# Patient Record
Sex: FEMALE | Race: ASIAN | URBAN - METROPOLITAN AREA
[De-identification: names, ages, dates, MRNs, and addresses within clinical notes are randomized per-mention and may not be internally consistent; named-entity substitution may affect disease eponyms.]

---

## 2017-06-05 ENCOUNTER — CONSULT (OUTPATIENT)
Dept: URBAN - METROPOLITAN AREA CLINIC 24 | Facility: CLINIC | Age: 57
End: 2017-06-05
Payer: COMMERCIAL

## 2017-06-05 PROCEDURE — 92083 EXTENDED VISUAL FIELD XM: CPT | Performed by: OPHTHALMOLOGY

## 2017-06-05 PROCEDURE — 92004 COMPRE OPH EXAM NEW PT 1/>: CPT | Performed by: OPHTHALMOLOGY

## 2017-06-05 PROCEDURE — 92020 GONIOSCOPY: CPT | Performed by: OPHTHALMOLOGY

## 2017-06-05 PROCEDURE — 76514 ECHO EXAM OF EYE THICKNESS: CPT | Performed by: OPHTHALMOLOGY

## 2017-06-05 PROCEDURE — 92250 FUNDUS PHOTOGRAPHY W/I&R: CPT | Performed by: OPHTHALMOLOGY

## 2017-06-05 ASSESSMENT — INTRAOCULAR PRESSURE
OD: 19
OS: 19

## 2018-02-27 ENCOUNTER — FOLLOW UP ESTABLISHED (OUTPATIENT)
Dept: URBAN - METROPOLITAN AREA CLINIC 24 | Facility: CLINIC | Age: 58
End: 2018-02-27
Payer: COMMERCIAL

## 2018-02-27 DIAGNOSIS — H40.033 ANATOMICAL NARROW ANGLE, BILATERAL: Primary | ICD-10-CM

## 2018-02-27 PROCEDURE — 92012 INTRM OPH EXAM EST PATIENT: CPT | Performed by: OPHTHALMOLOGY

## 2018-02-27 ASSESSMENT — INTRAOCULAR PRESSURE
OD: 16
OS: 17

## 2022-02-01 ENCOUNTER — OFFICE VISIT (OUTPATIENT)
Dept: URBAN - METROPOLITAN AREA CLINIC 24 | Facility: CLINIC | Age: 62
End: 2022-02-01
Payer: COMMERCIAL

## 2022-02-01 PROCEDURE — 99204 OFFICE O/P NEW MOD 45 MIN: CPT | Performed by: OPHTHALMOLOGY

## 2022-02-01 PROCEDURE — 92133 CPTRZD OPH DX IMG PST SGM ON: CPT | Performed by: OPHTHALMOLOGY

## 2022-02-01 ASSESSMENT — INTRAOCULAR PRESSURE
OS: 18
OD: 18

## 2022-02-01 NOTE — IMPRESSION/PLAN
Impression: Anatomical narrow angle, bilateral Plan: PT IS A IS AT RISK FOR NAG OU      GONIO GRADE ANT TM OU (6/5/17)       PACHS   (6/5/17) REFERRED BY DR. Mj JARRETT OD
PT DENIES SULFA ALLERGY REPORTS ASTHMA THEREFORE WILL AVOID B-BLOCKER
TARGET  IOP  IS LOW TEENS OR LESS OU 
RECOMMEND 1. PT IS A RISK FOR ANGLE CLOSURE 2. RECOMMEND LPI  TO AVOID ANGLE CLOSURE. DISCUSSED RISKS VS BENEFITS OF LPI. PT IS AWARE THAT THERE IS A 20% CHANCE THAT THEY MAY REQUIRE ENLARGEMENT OF THE LPI TO CREATE AN IDEAL SIZED IRIDOTOMY. PT MAY EXPERIENCE AN INFERIOR FLOATER FOLLOWING THE LPI PROCEDURE. THE PT IS AWARE THAT IF ANGLE CLOSURE OCCURS, THEY MAY EXPERIENCE  SIGNIFICANT VISION LOSS AND POSSIBLY COMPLETE LOSS OF ALL SIGHT IN THE AFFECTED EYE. 3. PT DEFERS LPI OU 06/05/17-2/1/22
4. ON 06/05/17 THE PT WAS GIVEN WRITTEN LITERATURE ON THE NATURE OF ANGLE CLOSURE 5.  THE PT CAN RETURN FOR F/U 1-12  MONTHS OR SOONER IF PT WISHES TO UNDERGO LPI

## 2022-04-25 ENCOUNTER — OFFICE VISIT (OUTPATIENT)
Dept: URBAN - METROPOLITAN AREA CLINIC 24 | Facility: CLINIC | Age: 62
End: 2022-04-25
Payer: COMMERCIAL

## 2022-04-25 DIAGNOSIS — H25.813 COMBINED FORMS OF AGE-RELATED CATARACT, BILATERAL: ICD-10-CM

## 2022-04-25 DIAGNOSIS — H40.033 ANATOMICAL NARROW ANGLE, BILATERAL: Primary | ICD-10-CM

## 2022-04-25 PROCEDURE — 92133 CPTRZD OPH DX IMG PST SGM ON: CPT | Performed by: OPTOMETRIST

## 2022-04-25 PROCEDURE — 99204 OFFICE O/P NEW MOD 45 MIN: CPT | Performed by: OPTOMETRIST

## 2022-04-25 ASSESSMENT — INTRAOCULAR PRESSURE
OS: 15
OD: 15

## 2022-04-25 ASSESSMENT — VISUAL ACUITY
OS: 20/20
OD: 20/20

## 2022-04-25 NOTE — IMPRESSION/PLAN
Impression: Anatomical narrow angle, bilateral: H40.033. Plan: Recommend patient continue ongoing care w/ Dr Chad Mckeon as directed. LPIs repeatedly recommended.

## 2024-07-16 ENCOUNTER — OFFICE VISIT (OUTPATIENT)
Dept: URBAN - METROPOLITAN AREA CLINIC 24 | Facility: CLINIC | Age: 64
End: 2024-07-16
Payer: COMMERCIAL

## 2024-07-16 DIAGNOSIS — H40.033 ANATOMICAL NARROW ANGLE, BILATERAL: Primary | ICD-10-CM

## 2024-07-16 PROCEDURE — 92083 EXTENDED VISUAL FIELD XM: CPT | Performed by: OPHTHALMOLOGY

## 2024-07-16 PROCEDURE — 99214 OFFICE O/P EST MOD 30 MIN: CPT | Performed by: OPHTHALMOLOGY

## 2024-07-16 PROCEDURE — 92020 GONIOSCOPY: CPT | Performed by: OPHTHALMOLOGY

## 2024-07-16 ASSESSMENT — INTRAOCULAR PRESSURE
OS: 18
OD: 19